# Patient Record
Sex: MALE
[De-identification: names, ages, dates, MRNs, and addresses within clinical notes are randomized per-mention and may not be internally consistent; named-entity substitution may affect disease eponyms.]

---

## 2018-07-20 ENCOUNTER — HOSPITAL ENCOUNTER (INPATIENT)
Dept: HOSPITAL 31 - C.SDS | Age: 63
LOS: 1 days | Discharge: HOME | DRG: 714 | End: 2018-07-21
Attending: INTERNAL MEDICINE | Admitting: INTERNAL MEDICINE
Payer: COMMERCIAL

## 2018-07-20 VITALS — BODY MASS INDEX: 25 KG/M2

## 2018-07-20 VITALS — RESPIRATION RATE: 20 BRPM

## 2018-07-20 DIAGNOSIS — N40.1: Primary | ICD-10-CM

## 2018-07-20 DIAGNOSIS — Z87.19: ICD-10-CM

## 2018-07-20 DIAGNOSIS — R33.8: ICD-10-CM

## 2018-07-20 DIAGNOSIS — Z90.49: ICD-10-CM

## 2018-07-20 PROCEDURE — 0VT08ZZ RESECTION OF PROSTATE, VIA NATURAL OR ARTIFICIAL OPENING ENDOSCOPIC: ICD-10-PCS | Performed by: UROLOGY

## 2018-07-20 RX ADMIN — OXYCODONE HYDROCHLORIDE AND ACETAMINOPHEN PRN TAB: 5; 325 TABLET ORAL at 21:14

## 2018-07-20 RX ADMIN — HYDROMORPHONE HYDROCHLORIDE PRN MG: 1 INJECTION, SOLUTION INTRAMUSCULAR; INTRAVENOUS; SUBCUTANEOUS at 16:14

## 2018-07-20 RX ADMIN — CIPROFLOXACIN SCH MLS/HR: 2 INJECTION, SOLUTION INTRAVENOUS at 21:53

## 2018-07-20 RX ADMIN — HYDROMORPHONE HYDROCHLORIDE PRN MG: 1 INJECTION, SOLUTION INTRAMUSCULAR; INTRAVENOUS; SUBCUTANEOUS at 13:55

## 2018-07-20 RX ADMIN — HYDROMORPHONE HYDROCHLORIDE PRN MG: 1 INJECTION, SOLUTION INTRAMUSCULAR; INTRAVENOUS; SUBCUTANEOUS at 14:47

## 2018-07-21 VITALS — OXYGEN SATURATION: 96 %

## 2018-07-21 VITALS — TEMPERATURE: 98.4 F | DIASTOLIC BLOOD PRESSURE: 73 MMHG | HEART RATE: 88 BPM | SYSTOLIC BLOOD PRESSURE: 110 MMHG

## 2018-07-21 LAB
BUN SERPL-MCNC: 13 MG/DL (ref 9–20)
CALCIUM SERPL-MCNC: 8.7 MG/DL (ref 8.6–10.4)
ERYTHROCYTE [DISTWIDTH] IN BLOOD BY AUTOMATED COUNT: 13.6 % (ref 11.5–14.5)
GFR NON-AFRICAN AMERICAN: > 60
HGB BLD-MCNC: 14.5 G/DL (ref 12–18)
MCH RBC QN AUTO: 29.4 PG (ref 27–31)
MCHC RBC AUTO-ENTMCNC: 33.9 G/DL (ref 33–37)
MCV RBC AUTO: 86.8 FL (ref 80–94)
PLATELET # BLD: 249 K/UL (ref 130–400)
PMV BLD AUTO: 8.5 FL (ref 7.2–11.7)
RBC # BLD AUTO: 4.92 MIL/UL (ref 4.4–5.9)
WBC # BLD AUTO: 11.5 K/UL (ref 4.8–10.8)

## 2018-07-21 RX ADMIN — CIPROFLOXACIN SCH MLS/HR: 2 INJECTION, SOLUTION INTRAVENOUS at 09:34

## 2018-07-21 RX ADMIN — OXYCODONE HYDROCHLORIDE AND ACETAMINOPHEN PRN TAB: 5; 325 TABLET ORAL at 03:05

## 2018-07-21 RX ADMIN — OXYCODONE HYDROCHLORIDE AND ACETAMINOPHEN PRN TAB: 5; 325 TABLET ORAL at 09:41

## 2018-07-21 NOTE — CP.PCM.PN
Subjective





- Date & Time of Evaluation


Date of Evaluation: 07/21/18


Time of Evaluation: 16:00





- Subjective


Subjective: 





PATIENT WAS ADMITTED FOR BPH


DENIES CHEST PAIN SOB 


DENIES DYSURIA


NO SIGN OF DISTRESS NOTED 





Objective





- Vital Signs/Intake and Output


Vital Signs (last 24 hours): 


 











Temp Pulse Resp BP Pulse Ox


 


 98.0 F   74   20   118/73   96 


 


 07/21/18 07:00  07/21/18 07:00  07/21/18 07:00  07/21/18 07:00  07/21/18 07:00








Intake and Output: 


 











 07/21/18 07/21/18





 06:59 18:59


 


Intake Total 820 850


 


Output Total 650 3000


 


Balance 170 -2150














- Medications


Medications: 


 Current Medications





Docusate Sodium (Colace)  100 mg PO TID [facility]


   Last Admin: 07/21/18 13:20 Dose:  100 mg


Finasteride (Proscar)  5 mg PO DAILY [facility]


   Last Admin: 07/21/18 09:34 Dose:  5 mg


Ciprofloxacin (Cipro 400mg/200ml Dsw)  400 mg in 200 mls @ 133 mls/hr IVPB Q12H 

[facility]


   PRN Reason: Protocol


   Last Admin: 07/21/18 09:34 Dose:  133 mls/hr


Methimazole (Tapazole)  5 mg PO DAILY [facility]


   Last Admin: 07/21/18 09:34 Dose:  5 mg


Oxycodone/Acetaminophen (Percocet 5/325 Mg Tab)  1 tab PO Q4H PRN


   PRN Reason: Pain, moderate (4-7)


   Stop: 07/23/18 14:01


   Last Admin: 07/21/18 09:41 Dose:  1 tab











- Labs


Labs: 


 





 07/21/18 07:36 





 07/21/18 07:36 











Assessment and Plan





- Assessment and Plan (Free Text)


Assessment: 


PATIENT SEEN AND EXAMINED AT THE BEDSIDE 


RODRIGUEZ CATH IN PLACE / URINE LIGHT PINK COLOR / NO BLOOD CLOT NOTED


DISCUSS WITH DR CRUZ WHO CLEAR FOR DC


FOLLOW UP WITH DR CRUZ IN HIS OFFICE IN 1-2 WEEK ---CALL FOR APPOITMENT


FOLLOW UP WITH DR NOE IN HIS OFFICE ---CALL FOR APPOINTMENT


CONTINUE HOME MEDICATION


NEW PRESCRIPTION GIVEN


   PROSCAR 5 MG PO DAILY 


   COLACE 100 MG PO TID FOR 7 DAYS 


WITH PERCOCET PRESCRIPTION GIVEN BY DR NOE IN THE CHART 


ACITIVIY AS TOLERATED 


[facility] CATH CARE


DRINK PLENTY OF WATER FOR AT LEAST A WEEK


CALL DR CRUZ OR DR NOE OR GO TO THE EMERGENCY ROOM IF SYMPTOMS RETURN 

OR WORSENING


DISCUSS WITH PATIENT WHO AGREE AND VERBALIZED UNDERSTANDING

## 2018-07-23 NOTE — OP
PROCEDURE DATE:  07/20/2018



PREOPERATIVE DIAGNOSES:  Urinary retention and prostatic enlargement.



POSTOPERATIVE DIAGNOSES:  Urinary retention and prostatic enlargement.



PROCEDURES:  Cystoscopy, transurethral resection of prostate.



OPERATING SURGEON  Sloane Dean MD



PROCEDURE AS FOLLOWS:  The patient received perioperative antibiotics.



The patient received general anesthesia via laryngeal mask airway.



The patient was placed in lithotomy position.  Genitalia were prepped and

draped sterilely.



The procedure was performed under video endoscopic control.



A 26-Czech continuous flow resectoscope sheath was introduced under direct

vision using the visual obturator.



The urethra, prostate and bladder were inspected.



FINDINGS:  There was no stricture in the anterior urethra.  There was

evidence of lateral lobe prostatic hypertrophy.  The prostatic urethra was

approximately 4 cm in length.  There was significant intravesical intrusion

of the middle lobe.



There was moderate bladder trabeculation.  The ureteral orifices were

identified.  There was no bladder tumor.  There was no bladder stone.



The resectoscope was inserted.



Resection of the prostate was performed as follows.  Resection was begun at

the level of the bladder neck.  The ureteral orifices were spared

throughout this bladder neck resection.  The intravesical middle lobe

intrusion was resected as well.



Hemostasis was achieved after each section of resection.



Thereafter, the anterior roof tissue was resected.  Subsequently, the

lateral lobes were resected.  The floor and apical prostatic tissues were

resected as well.



There was noted to be increased vascularity of the prostate.



The prostatic chips were removed using the Microvasive evacuator.  The

hemostasis was achieved.



The resectoscope was reinserted.  There were no residual prostatic chips. 

The ureteral orifices were intact.  Hemostasis was complete.



The resectoscope and sheath were removed.  A Chandler catheter was inserted. 

Bladder drainage was clear with mild traction applied.



The patient tolerated the procedure without complication.





__________________________________________

Sloane Dean MD





DD:  07/22/2018 16:35:07

DT:  07/22/2018 16:37:02

Job # [id number]

## 2018-07-24 NOTE — CP.PCM.DIS
Provider





- Provider


Date of Admission: 


07/20/18 14:40





Attending physician: 


Shey Lackey MD





Time Spent in preparation of Discharge (in minutes): 20





Hospital Course





- Lab Results


Lab Results: 


 Micro Results





07/20/18 12:45   Urine,Catheterized   Urine Culture - Final


                            No Growth (<1,000 CFU/ML)





 Most Recent Lab Values











WBC  11.5 K/uL (4.8-10.8)  H  07/21/18  07:36    


 


RBC  4.92 Mil/uL (4.40-5.90)   07/21/18  07:36    


 


Hgb  14.5 g/dL (12.0-18.0)   07/21/18  07:36    


 


Hct  42.7 % (35.0-51.0)   07/21/18  07:36    


 


MCV  86.8 fL (80.0-94.0)   07/21/18  07:36    


 


MCH  29.4 pg (27.0-31.0)   07/21/18  07:36    


 


MCHC  33.9 g/dL (33.0-37.0)   07/21/18  07:36    


 


RDW  13.6 % (11.5-14.5)   07/21/18  07:36    


 


Plt Count  249 K/uL (130-400)   07/21/18  07:36    


 


MPV  8.5 fL (7.2-11.7)   07/21/18  07:36    


 


Sodium  139 mmol/L (132-148)   07/21/18  07:36    


 


Potassium  4.8 mmol/L (3.6-5.2)   07/21/18  07:36    


 


Chloride  100 mmol/L ()   07/21/18  07:36    


 


Carbon Dioxide  31 mmol/L (22-30)  H  07/21/18  07:36    


 


Anion Gap  13  (10-20)   07/21/18  07:36    


 


BUN  13 mg/dL (9-20)   07/21/18  07:36    


 


Creatinine  1.0 mg/dL (0.8-1.5)   07/21/18  07:36    


 


Est GFR ( Amer)  > 60   07/21/18  07:36    


 


Est GFR (Non-Af Amer)  > 60   07/21/18  07:36    


 


Random Glucose  107 mg/dL ()   07/21/18  07:36    


 


Calcium  8.7 mg/dl (8.6-10.4)   07/21/18  07:36    














Discharge Plan





- Discharge Medications


Prescriptions: 


Docusate [Colace] 100 mg PO TID 7 Days  cap


Finasteride [Proscar] 5 mg PO DAILY 30 Days  tab





- Follow Up Plan


Condition: GOOD


Disposition: HOME/ ROUTINE


Instructions:  Benign Prostatic Hyperplasia (Enlarged Prostate), How to Care 

for Your Rodriguez Catheter, Male, Docusate, Finasteride, Oxycodone and 

Acetaminophen


Additional Instructions: 


FOLLOW UP WITH DR LACKEY IN HIS OFFICE IN 1-2 WEEK ---CALL FOR APPOITMENT


FOLLOW UP WITH DR DEAN IN HIS OFFICE ---CALL FOR APPOINTMENT


CONTINUE HOME MEDICATION


NEW PRESCRIPTION GIVEN


   PROSCAR 5 MG PO DAILY 


   COLACE 100 MG PO TID FOR 7 DAYS 


WITH PERCOCET PRESCRIPTION GIVEN BY DR DEAN IN THE CHART 


ACITIVIY AS TOLERATED 


[facility] CATH CARE


DRINK PLENTY OF WATER FOR AT LEAST A WEEK


CALL DR LACKEY OR DR DEAN OR GO TO THE EMERGENCY ROOM IF SYMPTOMS RETURN 

OR WORSENING


Referrals: 


Shey Lackey MD [Staff Provider] - 


Milind Dena MD [Staff Provider] - 


Sloane Dean MD [Staff Provider] -

## 2023-11-09 NOTE — PCM.SURG1
Surgeon's Initial Post Op Note





- Surgeon's Notes


Surgeon: JOHN Dean


Assistant: NONE


Type of Anesthesia: General LMA


Pre-Operative Diagnosis: Urinary retention.  BPH


Operative Findings: same


Post-Operative Diagnosis: same


Operation Performed: TURP


Specimen/Specimens Removed: urine.  Prostate


Estimated Blood Loss: EBL {In ML}: 150


Blood Products Given: N/A


Post-Op Condition: Good


Date of Surgery/Procedure: 07/20/18


Time of Surgery/Procedure: 13:50 Walk in